# Patient Record
Sex: MALE | Race: BLACK OR AFRICAN AMERICAN | NOT HISPANIC OR LATINO | Employment: OTHER | URBAN - METROPOLITAN AREA
[De-identification: names, ages, dates, MRNs, and addresses within clinical notes are randomized per-mention and may not be internally consistent; named-entity substitution may affect disease eponyms.]

---

## 2022-06-23 PROBLEM — R35.0 FREQUENCY OF MICTURITION: Status: ACTIVE | Noted: 2022-06-23

## 2022-06-23 PROBLEM — N52.1 ERECTILE DYSFUNCTION DUE TO DISEASES CLASSIFIED ELSEWHERE: Status: ACTIVE | Noted: 2022-06-23

## 2022-06-23 PROBLEM — Z80.42 FAMILY HISTORY OF PROSTATE CANCER: Status: ACTIVE | Noted: 2022-06-23

## 2022-06-23 PROBLEM — C61 PROSTATE CANCER: Status: ACTIVE | Noted: 2022-06-23

## 2022-09-27 PROBLEM — N52.8 OTHER MALE ERECTILE DYSFUNCTION: Status: ACTIVE | Noted: 2022-06-23

## 2022-09-27 PROBLEM — N32.81 OAB (OVERACTIVE BLADDER): Status: ACTIVE | Noted: 2022-09-27

## 2023-02-16 PROBLEM — M25.569 KNEE PAIN: Status: ACTIVE | Noted: 2023-02-16

## 2023-02-16 PROBLEM — J44.1 ACUTE EXACERBATION OF CHRONIC OBSTRUCTIVE AIRWAYS DISEASE: Status: ACTIVE | Noted: 2022-02-02

## 2023-02-16 PROBLEM — C61 ADENOCARCINOMA OF PROSTATE: Status: ACTIVE | Noted: 2023-02-16

## 2023-02-16 PROBLEM — I50.9 CONGESTIVE HEART FAILURE: Status: ACTIVE | Noted: 2023-02-16

## 2023-02-16 PROBLEM — Z72.0 TOBACCO USER: Status: ACTIVE | Noted: 2023-02-16

## 2023-02-16 PROBLEM — N52.01 ERECTILE DYSFUNCTION DUE TO ARTERIAL INSUFFICIENCY: Status: ACTIVE | Noted: 2017-06-23

## 2023-02-16 PROBLEM — E66.01 MORBID OBESITY: Status: ACTIVE | Noted: 2023-02-16

## 2023-02-16 PROBLEM — J41.8 MIXED SIMPLE AND MUCOPURULENT CHRONIC BRONCHITIS: Status: ACTIVE | Noted: 2023-02-16

## 2023-02-16 PROBLEM — E11.9 DIABETES MELLITUS: Status: ACTIVE | Noted: 2023-02-16

## 2023-04-10 PROBLEM — R35.1 NOCTURIA: Status: ACTIVE | Noted: 2023-04-10

## 2024-02-27 ENCOUNTER — PATIENT OUTREACH (OUTPATIENT)
Dept: ADMINISTRATIVE | Facility: HOSPITAL | Age: 69
End: 2024-02-27
Payer: MEDICARE

## 2024-06-19 PROBLEM — R13.19 OTHER DYSPHAGIA: Status: ACTIVE | Noted: 2024-06-19

## 2024-08-14 PROBLEM — J41.8 MIXED SIMPLE AND MUCOPURULENT CHRONIC BRONCHITIS: Status: ACTIVE | Noted: 2024-08-14

## 2024-08-14 PROBLEM — E26.1 SECONDARY HYPERALDOSTERONISM: Status: ACTIVE | Noted: 2024-08-14

## 2025-03-17 ENCOUNTER — PATIENT OUTREACH (OUTPATIENT)
Dept: ADMINISTRATIVE | Facility: HOSPITAL | Age: 70
End: 2025-03-17
Payer: MEDICARE

## 2025-03-17 DIAGNOSIS — E11.9 TYPE 2 DIABETES MELLITUS WITHOUT COMPLICATION, WITHOUT LONG-TERM CURRENT USE OF INSULIN: Primary | ICD-10-CM

## 2025-03-19 ENCOUNTER — PATIENT OUTREACH (OUTPATIENT)
Dept: ADMINISTRATIVE | Facility: HOSPITAL | Age: 70
End: 2025-03-19
Payer: MEDICARE

## 2025-06-18 ENCOUNTER — TELEPHONE (OUTPATIENT)
Dept: PHARMACY | Facility: CLINIC | Age: 70
End: 2025-06-18
Payer: MEDICARE

## 2025-06-18 NOTE — TELEPHONE ENCOUNTER
[facility]/Population Health Chart Review & Patient Outreach Details For Medication Adherence Project    Reason for Outreach Encounter: 3rd Party payor non-compliance report ([employer], [employer], C, etc)  2.  Patient Outreach Method: Reviewed patient chart  and [employer] message  3.   Medication in question:    Hyperlipidemia Medications              atorvastatin (LIPITOR) 20 MG tablet Take 1 tablet (20 mg total) by mouth once daily.                  atorvastatin  last filled  6/22 for 90 day supply      4.  Reviewed and or Updates Made To: Patient Chart  5. Outreach Outcomes and/or actions taken: Sent inquiry to patient: Waiting for response  Additional Notes: